# Patient Record
Sex: MALE | Race: WHITE
[De-identification: names, ages, dates, MRNs, and addresses within clinical notes are randomized per-mention and may not be internally consistent; named-entity substitution may affect disease eponyms.]

---

## 2018-11-16 ENCOUNTER — HOSPITAL ENCOUNTER (EMERGENCY)
Dept: HOSPITAL 8 - ED | Age: 38
Discharge: HOME | End: 2018-11-16
Payer: COMMERCIAL

## 2018-11-16 VITALS — SYSTOLIC BLOOD PRESSURE: 113 MMHG | DIASTOLIC BLOOD PRESSURE: 56 MMHG

## 2018-11-16 VITALS — HEIGHT: 71 IN | WEIGHT: 220.46 LBS | BODY MASS INDEX: 30.86 KG/M2
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
## 2018-11-16 DIAGNOSIS — S92.531A: Primary | ICD-10-CM

## 2018-11-16 PROCEDURE — 99284 EMERGENCY DEPT VISIT MOD MDM: CPT

## 2018-11-16 PROCEDURE — 28515 TREATMENT OF TOE FRACTURE: CPT

## 2022-02-14 ENCOUNTER — OFFICE VISIT (OUTPATIENT)
Dept: DERMATOLOGY | Facility: IMAGING CENTER | Age: 42
End: 2022-02-14
Payer: COMMERCIAL

## 2022-02-14 VITALS — TEMPERATURE: 97.5 F | HEIGHT: 71 IN | WEIGHT: 220 LBS | BODY MASS INDEX: 30.8 KG/M2

## 2022-02-14 DIAGNOSIS — D23.9 DERMATOFIBROMA: ICD-10-CM

## 2022-02-14 PROCEDURE — 99202 OFFICE O/P NEW SF 15 MIN: CPT | Performed by: NURSE PRACTITIONER

## 2022-02-14 NOTE — PROGRESS NOTES
"DERMATOLOGY NOTE  NEW VISIT       Chief complaint: Skin Lesion     Patient here to establish care for one skin lesion of concern today.    HPI/location: RT posterior lower leg, brown, tan papule  Time present: 15 years  Painful lesion: No  Itching lesion: No  Enlarging lesion: No  Anything make it better or worse? No    History of skin cancer: No  History of precancers/actinic keratoses: No  History of biopsies:No  History of blistering/severe sunburns:Yes, Details: Lifetime sun exposure, lived in [place] x 5 years  Family history of skin cancer:No  Family history of atypical moles:No      Allergies   Allergen Reactions   • Codeine         MEDICATIONS:  Medications relevant to specialty reviewed.     REVIEW OF SYSTEMS:   Positive for skin (see HPI)  Negative for fevers and chills       EXAM:  Temp 36.4 °C (97.5 °F)   Ht 1.803 m (5' 11\")   Wt 99.8 kg (220 lb)   BMI 30.68 kg/m²   Constitutional: Well-developed, well-nourished, and in no distress.     A focused skin exam was performed including the affected areas of the lower extremity. Notable findings on exam today listed below and/or in assessment/plan.     8 mm lesion consistent with finding associated with DF to R posterior calf    IMPRESSION / PLAN:    1. Dermatofibroma  - Benign-appearing nature of lesions discussed. Advised to return to clinic for any new or concerning changes.  - ABCDE's of melanoma discussed  Discussed nature/course of lesion         Please note that this dictation was created using voice recognition software. I have made every reasonable attempt to correct obvious errors, but I expect that there are errors of grammar and possibly content that I did not discover before finalizing the note.      Return to clinic in: Return for as needed. and as needed for any new or changing skin lesions.      "

## 2022-11-27 ENCOUNTER — OFFICE VISIT (OUTPATIENT)
Dept: URGENT CARE | Facility: CLINIC | Age: 42
End: 2022-11-27
Payer: COMMERCIAL

## 2022-11-27 VITALS
DIASTOLIC BLOOD PRESSURE: 74 MMHG | HEART RATE: 80 BPM | TEMPERATURE: 98.8 F | WEIGHT: 230 LBS | SYSTOLIC BLOOD PRESSURE: 118 MMHG | RESPIRATION RATE: 16 BRPM | BODY MASS INDEX: 32.2 KG/M2 | HEIGHT: 71 IN | OXYGEN SATURATION: 95 %

## 2022-11-27 DIAGNOSIS — B96.89 ACUTE BACTERIAL SINUSITIS: ICD-10-CM

## 2022-11-27 DIAGNOSIS — J02.0 ACUTE STREPTOCOCCAL PHARYNGITIS: ICD-10-CM

## 2022-11-27 DIAGNOSIS — J01.90 ACUTE BACTERIAL SINUSITIS: ICD-10-CM

## 2022-11-27 DIAGNOSIS — Z20.818 EXPOSURE TO STREP THROAT: ICD-10-CM

## 2022-11-27 LAB
INT CON NEG: NORMAL
INT CON POS: NORMAL
S PYO AG THROAT QL: POSITIVE

## 2022-11-27 PROCEDURE — 87880 STREP A ASSAY W/OPTIC: CPT | Performed by: FAMILY MEDICINE

## 2022-11-27 PROCEDURE — 99203 OFFICE O/P NEW LOW 30 MIN: CPT | Performed by: FAMILY MEDICINE

## 2022-11-27 RX ORDER — AMOXICILLIN 875 MG/1
TABLET, COATED ORAL
Qty: 20 TABLET | Refills: 0 | Status: SHIPPED | OUTPATIENT
Start: 2022-11-27 | End: 2022-12-07

## 2022-11-27 ASSESSMENT — FIBROSIS 4 INDEX: FIB4 SCORE: 0.75

## 2022-11-27 NOTE — PROGRESS NOTES
Chief Complaint:    Chief Complaint   Patient presents with    Sore Throat     X2 weeks, exposure to strep throat by wife     Nasal Congestion       History of Present Illness:    Started with symptoms 2 weeks ago, sometimes he will get sinus infection eventually and then will move down into chest. Amoxil 875 mg has worked and been tolerated for previous sinus infection. He has nasal symptoms with purulent mucus from nose, pressure in maxillary sinus regions, and sore throat. Few days ago wife tested positive for strep throat. She was rx'd Amoxil 500 mg BID x 10 days which she is taking and getting better.      Past Medical History:    No past medical history on file.    Past Surgical History:    No past surgical history on file.    Social History:    Social History     Socioeconomic History    Marital status: Single     Spouse name: Not on file    Number of children: Not on file    Years of education: Not on file    Highest education level: Not on file   Occupational History    Not on file   Tobacco Use    Smoking status: Never    Smokeless tobacco: Never   Vaping Use    Vaping Use: Never used   Substance and Sexual Activity    Alcohol use: Yes     Comment: Social use only    Drug use: Never    Sexual activity: Not on file   Other Topics Concern    Not on file   Social History Narrative    Not on file     Social Determinants of Health     Financial Resource Strain: Not on file   Food Insecurity: Not on file   Transportation Needs: Not on file   Physical Activity: Not on file   Stress: Not on file   Social Connections: Not on file   Intimate Partner Violence: Not on file   Housing Stability: Not on file     Family History:    No family history on file.    Medications:    No current outpatient medications on file prior to visit.     No current facility-administered medications on file prior to visit.     Allergies:    Allergies   Allergen Reactions    Codeine        Vitals:    Vitals:    11/27/22 1523   BP: 118/74  "  Pulse: 80   Resp: 16   Temp: 37.1 °C (98.8 °F)   TempSrc: Temporal   SpO2: 95%   Weight: 104 kg (230 lb)   Height: 1.803 m (5' 11\")       Physical Exam:    Constitutional: Vital signs reviewed. Appears well-developed and well-nourished. No acute distress.   Eyes: Sclera white, conjunctivae clear.   ENT: External ears normal. External auditory canals normal without discharge. TMs translucent and non-bulging. Hearing normal. Lips/teeth are normal. Oral mucosa pink and moist. Posterior pharynx: moderately erythematous and mildly swollen.  Neck: Neck supple.   Cardiovascular: Regular rate and rhythm. No murmur.  Pulmonary/Chest: Respirations non-labored. Clear to auscultation bilaterally.  Musculoskeletal: Normal gait. No muscular atrophy or weakness.  Neurological: Alert and oriented to person, place, and time. Muscle tone normal. Coordination normal.   Skin: No rashes or lesions. Warm, dry, normal turgor.  Psychiatric: Normal mood and affect. Behavior is normal. Judgment and thought content normal.       Diagnostics:    Rapid Strep test is positive.      Medical Decision Makin. Acute streptococcal pharyngitis  - POCT Rapid Strep A  - amoxicillin (AMOXIL) 875 MG tablet; 1 TAB BY MOUTH TWICE A DAY X 10 DAYS.  Dispense: 20 Tablet; Refill: 0    2. Exposure to strep throat  - POCT Rapid Strep A  - amoxicillin (AMOXIL) 875 MG tablet; 1 TAB BY MOUTH TWICE A DAY X 10 DAYS.  Dispense: 20 Tablet; Refill: 0    3. Acute bacterial sinusitis  - amoxicillin (AMOXIL) 875 MG tablet; 1 TAB BY MOUTH TWICE A DAY X 10 DAYS.  Dispense: 20 Tablet; Refill: 0       Discussed with him DDX, management options, and risks, benefits, and alternatives to treatment plan agreed upon.    Started with symptoms 2 weeks ago, sometimes he will get sinus infection eventually and then will move down into chest. Amoxil 875 mg has worked and been tolerated for previous sinus infection. He has nasal symptoms with purulent mucus from nose, pressure in " maxillary sinus regions, and sore throat. Few days ago wife tested positive for strep throat. She was rx'd Amoxil 500 mg BID x 10 days which she is taking and getting better.    Posterior pharynx: moderately erythematous and mildly swollen.    Rapid Strep test is positive.    May use over-the-counter meds for symptoms as needed.     Agreeable to medication prescribed.    Discussed expected course of duration, time for improvement, and to seek follow-up in Emergency Room, urgent care, or with PCP if getting worse at any time or not improving within expected time frame.

## 2025-04-15 ENCOUNTER — OFFICE VISIT (OUTPATIENT)
Dept: DERMATOLOGY | Facility: IMAGING CENTER | Age: 45
End: 2025-04-15
Payer: COMMERCIAL

## 2025-04-15 DIAGNOSIS — D18.01 CHERRY ANGIOMA: ICD-10-CM

## 2025-04-15 DIAGNOSIS — B35.4 TINEA CORPORIS: ICD-10-CM

## 2025-04-15 DIAGNOSIS — D22.9 MULTIPLE NEVI: ICD-10-CM

## 2025-04-15 DIAGNOSIS — L81.4 LENTIGINES: ICD-10-CM

## 2025-04-15 DIAGNOSIS — B35.3 TINEA PEDIS OF RIGHT FOOT: ICD-10-CM

## 2025-04-15 PROCEDURE — 99213 OFFICE O/P EST LOW 20 MIN: CPT | Performed by: NURSE PRACTITIONER

## 2025-04-15 RX ORDER — KETOCONAZOLE 20 MG/G
CREAM TOPICAL
Qty: 60 G | Refills: 3 | Status: SHIPPED | OUTPATIENT
Start: 2025-04-15

## 2025-04-15 NOTE — PROGRESS NOTES
"DERMATOLOGY NOTE  FOLLOW UP VISIT       Chief complaint: Follow-Up (Spot check )     HPI/location: Right calf   Time present: 1 month  Painful lesion: No  Itching lesion: Yes  Enlarging lesion: Yes    HPI/location: Scalp and back   Time present: \"whole life\"  Painful lesion: No  Itching lesion: No  Enlarging lesion: No      History of skin cancer: No  History of precancers/actinic keratoses: No  History of biopsies:No  History of blistering/severe sunburns:Yes, Details: Lifetime sun exposure, lived in [place] x 5 years  Family history of skin cancer:No  Family history of atypical moles:No      Allergies   Allergen Reactions    Codeine         MEDICATIONS:  Medications relevant to specialty reviewed.     REVIEW OF SYSTEMS:   Positive for skin (see HPI)  Negative for fevers and chills       EXAM:  There were no vitals taken for this visit.  Constitutional: Well-developed, well-nourished, and in no distress.     A focused skin exam was performed including the affected areas of the scalp, back and RLE. Notable findings on exam today listed below and/or in assessment/plan.     Annular papule with mild central clearing, slightly raised scaly border  Erythema, scale and skin cracking between 1st and second toe  Several scattered 1-3mm bright red macules and thin papules on the trunk and scalp  -sun exposed skin of trunk and b/l upper with scattered clinically benign light brown reticulated macules all of which were morphologically similar and none of which were suspicious for skin cancer today on exam        IMPRESSION / PLAN:    1. Tinea corporis  Rx below  Follow up in 6 weeks  - ketoconazole (NIZORAL) 2 % Cream; AAA daily in am with OTC lamisil in pm  Dispense: 60 g; Refill: 3    2. Tinea pedis of right foot  Rx below, follow up 6 weeks  - ketoconazole (NIZORAL) 2 % Cream; AAA daily in am with OTC lamisil in pm  Dispense: 60 g; Refill: 3    3. Cherry angioma  - Benign-appearing nature of lesions discussed during exam. "   - Advised to continue to monitor for any return to clinic for new or concerning changes.      4. Lentigines  - Benign-appearing nature of lesions discussed during exam.   - Advised to continue to monitor for any return to clinic for new or concerning changes.      5. Multiple nevi  - Benign-appearing nature of lesions discussed during exam.   - Advised to continue to monitor for any return to clinic for new or concerning changes.      Patient verbalized understanding and agrees with plan regarding the above            Please note that this dictation was created using voice recognition software. I have made every reasonable attempt to correct obvious errors, but I expect that there are errors of grammar and possibly content that I did not discover before finalizing the note.      Return to clinic in: Return in about 6 weeks (around 5/27/2025) for Rash follow up. and as needed for any new or changing skin lesions.

## 2025-05-27 ENCOUNTER — OFFICE VISIT (OUTPATIENT)
Dept: DERMATOLOGY | Facility: IMAGING CENTER | Age: 45
End: 2025-05-27
Payer: COMMERCIAL

## 2025-05-27 DIAGNOSIS — B35.4 TINEA CORPORIS: Primary | ICD-10-CM

## 2025-05-27 DIAGNOSIS — L65.9 HAIR LOSS: ICD-10-CM

## 2025-05-27 PROCEDURE — 99212 OFFICE O/P EST SF 10 MIN: CPT | Performed by: NURSE PRACTITIONER

## 2025-05-27 NOTE — PROGRESS NOTES
"DERMATOLOGY NOTE  FOLLOW UP VISIT       Chief complaint: Follow-Up and Rash     Patient here today for follow up on rash, patient states that things haven't changed much.       Previous visit  HPI/location: Right calf   Time present: 1 month  Painful lesion: No  Itching lesion: Yes  Enlarging lesion: Yes    HPI/location: Scalp and back   Time present: \"whole life\"  Painful lesion: No  Itching lesion: No  Enlarging lesion: No      History of skin cancer: No  History of precancers/actinic keratoses: No  History of biopsies:No  History of blistering/severe sunburns:Yes, Details: Lifetime sun exposure, lived in [place] x 5 years  Family history of skin cancer:No  Family history of atypical moles:No      Allergies   Allergen Reactions    Codeine         MEDICATIONS:  Medications relevant to specialty reviewed.     REVIEW OF SYSTEMS:   Positive for skin (see HPI)  Negative for fevers and chills       EXAM:  There were no vitals taken for this visit.  Constitutional: Well-developed, well-nourished, and in no distress.     A focused skin exam was performed including the affected areas of the scalp, back and RLE. Notable findings on exam today listed below and/or in assessment/plan.     Annular papule with mild central clearing, slightly raised scaly border  Mild hair thinning to vertex and crown, no evidence of lesion, infection, irritation or rash        IMPRESSION / PLAN:    1. Tinea corporis (favored)-- per pt, very mild improvement  Previously discussed course/nature of rash  Advised to continue ketoconazole cream daily in am, OTC terbinifine (or equivalent) in pm,  Do for next 8 weeks, if no improvement, need to consider bx    2. 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 I have made every reasonable attempt to correct obvious errors, but I expect that there are errors of grammar and possibly content that I did not discover before finalizing the note.      Return to clinic in: Return for PRN 8 weeks if no improvement. and as needed for any new or changing skin lesions.

## 2025-07-15 ENCOUNTER — OFFICE VISIT (OUTPATIENT)
Dept: DERMATOLOGY | Facility: IMAGING CENTER | Age: 45
End: 2025-07-15
Payer: COMMERCIAL

## 2025-07-15 DIAGNOSIS — L30.9 DERMATITIS: Primary | ICD-10-CM

## 2025-07-15 PROCEDURE — 99213 OFFICE O/P EST LOW 20 MIN: CPT | Performed by: NURSE PRACTITIONER

## 2025-07-15 RX ORDER — TRIAMCINOLONE ACETONIDE 1 MG/G
CREAM TOPICAL
Qty: 45 G | Refills: 1 | Status: SHIPPED | OUTPATIENT
Start: 2025-07-15

## 2025-07-15 NOTE — PROGRESS NOTES
"DERMATOLOGY NOTE  FOLLOW UP VISIT       Chief complaint: Follow-Up     Patient here today for follow up on rash,- rash on rt foot got infected -prescibed on ABX  now rash on foot is back. - hurts       Previous visit  HPI/location: Right calf   Time present: 1 month  Painful lesion: No  Itching lesion: Yes  Enlarging lesion: Yes    HPI/location: Scalp and back   Time present: \"whole life\"  Painful lesion: No  Itching lesion: No  Enlarging lesion: No      History of skin cancer: No  History of precancers/actinic keratoses: No  History of biopsies:No  History of blistering/severe sunburns:Yes, Details: Lifetime sun exposure, lived in [place] x 5 years  Family history of skin cancer:No  Family history of atypical moles:No      Allergies   Allergen Reactions    Codeine         MEDICATIONS:  Medications relevant to specialty reviewed.     REVIEW OF SYSTEMS:   Positive for skin (see HPI)  Negative for fevers and chills       EXAM:  There were no vitals taken for this visit.  Constitutional: Well-developed, well-nourished, and in no distress.     A focused skin exam was performed including the affected areas of the BLEs. Notable findings on exam today listed below and/or in assessment/plan.     Annular papules and plaques to dorsum of R foot and bilateral lower extremities, some with silvery scale and lichenification      IMPRESSION / PLAN:    1. Dermatitis, unspecified cause--ACD vs AD vs PSO vs tinea  Pt has been treated for tinea pedis when rash was limited to R foot, rash has spread and now has plaque appearance. Has not significantly improved with ketoconazole and terbinifine--rec. Bx, pt would like to postpone for a couple of weeks  In interim, will trial Rx below    - triamcinolone acetonide (KENALOG) 0.1 % Cream; AAA twice a day for 2 weeks at a time with 1-2 week break in between  Dispense: 45 g; Refill: 1        Patient verbalized understanding and agrees with plan regarding the above            Please note that " this dictation was created using voice recognition software. I have made every reasonable attempt to correct obvious errors, but I expect that there are errors of grammar and possibly content that I did not discover before finalizing the note.      Return to clinic in: No follow-ups on file. and as needed for any new or changing skin lesions.

## 2025-08-12 ENCOUNTER — OFFICE VISIT (OUTPATIENT)
Dept: DERMATOLOGY | Facility: IMAGING CENTER | Age: 45
End: 2025-08-12
Payer: COMMERCIAL

## 2025-08-12 DIAGNOSIS — L30.9 DERMATITIS: Primary | ICD-10-CM

## 2025-08-12 PROCEDURE — 99212 OFFICE O/P EST SF 10 MIN: CPT | Performed by: NURSE PRACTITIONER
